# Patient Record
Sex: FEMALE | Race: WHITE | Employment: OTHER | URBAN - METROPOLITAN AREA
[De-identification: names, ages, dates, MRNs, and addresses within clinical notes are randomized per-mention and may not be internally consistent; named-entity substitution may affect disease eponyms.]

---

## 2017-08-17 ENCOUNTER — HISTORICAL (OUTPATIENT)
Dept: PREADMISSION TESTING | Facility: HOSPITAL | Age: 52
End: 2017-08-17

## 2017-08-17 LAB
ABS NEUT (OLG): 2.93 X10(3)/MCL (ref 2.1–9.2)
ALBUMIN SERPL-MCNC: 3.9 GM/DL (ref 3.4–5)
ALBUMIN/GLOB SERPL: 1.4 RATIO (ref 1.1–2)
ALP SERPL-CCNC: 50 UNIT/L (ref 38–126)
ALT SERPL-CCNC: 17 UNIT/L (ref 12–78)
AST SERPL-CCNC: 7 UNIT/L (ref 15–37)
BASOPHILS # BLD AUTO: 0 X10(3)/MCL (ref 0–0.2)
BASOPHILS NFR BLD AUTO: 1 %
BILIRUB SERPL-MCNC: 0.5 MG/DL (ref 0.2–1)
BILIRUBIN DIRECT+TOT PNL SERPL-MCNC: 0.1 MG/DL (ref 0–0.5)
BILIRUBIN DIRECT+TOT PNL SERPL-MCNC: 0.4 MG/DL (ref 0–0.8)
BUN SERPL-MCNC: 20 MG/DL (ref 7–18)
CALCIUM SERPL-MCNC: 8.6 MG/DL (ref 8.5–10.1)
CHLORIDE SERPL-SCNC: 107 MMOL/L (ref 98–107)
CO2 SERPL-SCNC: 26 MMOL/L (ref 21–32)
CREAT SERPL-MCNC: 0.72 MG/DL (ref 0.55–1.02)
EOSINOPHIL # BLD AUTO: 0.1 X10(3)/MCL (ref 0–0.9)
EOSINOPHIL NFR BLD AUTO: 2 %
ERYTHROCYTE [DISTWIDTH] IN BLOOD BY AUTOMATED COUNT: 13.3 % (ref 11.5–17)
GLOBULIN SER-MCNC: 2.7 GM/DL (ref 2.4–3.5)
GLUCOSE SERPL-MCNC: 73 MG/DL (ref 74–106)
HCT VFR BLD AUTO: 39.3 % (ref 37–47)
HGB BLD-MCNC: 13 GM/DL (ref 12–16)
LYMPHOCYTES # BLD AUTO: 1.3 X10(3)/MCL (ref 0.6–4.6)
LYMPHOCYTES NFR BLD AUTO: 27 %
MCH RBC QN AUTO: 29.3 PG (ref 27–31)
MCHC RBC AUTO-ENTMCNC: 33.1 GM/DL (ref 33–36)
MCV RBC AUTO: 88.7 FL (ref 80–94)
MONOCYTES # BLD AUTO: 0.3 X10(3)/MCL (ref 0.1–1.3)
MONOCYTES NFR BLD AUTO: 6 %
NEUTROPHILS # BLD AUTO: 2.93 X10(3)/MCL (ref 2.1–9.2)
NEUTROPHILS NFR BLD AUTO: 63 %
PLATELET # BLD AUTO: 153 X10(3)/MCL (ref 130–400)
PMV BLD AUTO: 8.9 FL (ref 9.4–12.4)
POTASSIUM SERPL-SCNC: 4.4 MMOL/L (ref 3.5–5.1)
PROT SERPL-MCNC: 6.6 GM/DL (ref 6.4–8.2)
RBC # BLD AUTO: 4.43 X10(6)/MCL (ref 4.2–5.4)
SODIUM SERPL-SCNC: 141 MMOL/L (ref 136–145)
WBC # SPEC AUTO: 4.6 X10(3)/MCL (ref 4.5–11.5)

## 2017-09-06 ENCOUNTER — HISTORICAL (OUTPATIENT)
Dept: SURGERY | Facility: HOSPITAL | Age: 52
End: 2017-09-06

## 2019-03-29 LAB
BILIRUB SERPL-MCNC: NEGATIVE MG/DL
BLOOD URINE, POC: NEGATIVE
CLARITY, POC UA: NORMAL
COLOR, POC UA: NORMAL
GLUCOSE UR QL STRIP: NEGATIVE
KETONES UR QL STRIP: NEGATIVE
LEUKOCYTE EST, POC UA: NORMAL
NITRITE, POC UA: NEGATIVE
PH, POC UA: 7
PROTEIN, POC: NEGATIVE
SPECIFIC GRAVITY, POC UA: 1.01
UROBILINOGEN, POC UA: NORMAL

## 2022-04-10 ENCOUNTER — HISTORICAL (OUTPATIENT)
Dept: ADMINISTRATIVE | Facility: HOSPITAL | Age: 57
End: 2022-04-10

## 2022-04-26 VITALS
OXYGEN SATURATION: 100 % | BODY MASS INDEX: 26.31 KG/M2 | DIASTOLIC BLOOD PRESSURE: 76 MMHG | HEIGHT: 60 IN | SYSTOLIC BLOOD PRESSURE: 119 MMHG | WEIGHT: 134 LBS

## 2022-04-30 NOTE — OP NOTE
Patient:   Ingrid Zuniga            MRN: [id number]            FIN: [id number]               Age:   52 years     Sex:  Female     :  1965   Associated Diagnoses:   Cholecystitis, chronic   Author:   Channing Luna MD      Operative Note   Operative Information   Date/ Time:  2017 12:16:00.     Procedures Performed: Procedure Code   Cholecystectomy Laparoscopic (None) on 2017 at 52 Years.  Comments:  2017 10:17 - [place] BUSH, Melida RAMIREZ  auto-populated from documented surgical case.     Indications: chronic cholecystitis.     Preoperative Diagnosis: Cholecystitis, chronic ([id number] K81.1).     Postoperative Diagnosis: Cholecystitis, chronic ([id number]-UC K81.1).     Surgeon: Channing Luna MD.     Assistant: Svetlana Koehler.     Anesthesia: Gen..     Speciman Removed: gallbladder.     Description of Procedure/Findings/    Complications:     The patient was taken to the operating room. Patient was placed under general anesthesia. Abdomen was prepped and draped in the usual sterile fashion. An appropriate timeout was performed. Optical tipped trocar was used to access the peritoneal cavity. Pneumoperitoneum was instituted. Abdominal exploration was negative. Gallbladder was noted and held in a cephalad direction. The infundibulum was noted and held in a lateral direction. The peritoneum overlying the infundibulum was dissected revealing the cystic duct gallbladder junction and the cystic artery. Critical view was obtained. The cystic duct and cystic artery were clipped and transected. The gallbladder was removed from the undersurface of the liver with sharp and electric dissection. Hemostasis was assured. The clips were in excellent position and there was no bleeding or leakage of bile. Gallbladder was completely removed from the liver hemostasis was assured. The gallbladder was removed from the abdomen. Once again hemostasis was assured the operative site was irrigated until clear.  Trochars were removed and pneumoperitoneum released the incisions were closed with Vicryl..     Esimated blood loss: loss less than  15  cc.     Complications: None.

## 2022-06-23 ENCOUNTER — CLINICAL SUPPORT (OUTPATIENT)
Dept: OTHER | Facility: CLINIC | Age: 57
End: 2022-06-23
Payer: COMMERCIAL

## 2022-06-23 DIAGNOSIS — Z00.8 ENCOUNTER FOR OTHER GENERAL EXAMINATION: ICD-10-CM

## 2022-06-23 PROCEDURE — 99211 OFF/OP EST MAY X REQ PHY/QHP: CPT | Mod: S$GLB,,, | Performed by: INTERNAL MEDICINE

## 2022-06-23 PROCEDURE — 80061 LIPID PANEL: CPT | Mod: QW,S$GLB,, | Performed by: INTERNAL MEDICINE

## 2022-06-23 PROCEDURE — 82947 PR  ASSAY QUANTITATIVE,BLOOD GLUCOSE: ICD-10-PCS | Mod: QW,S$GLB,, | Performed by: INTERNAL MEDICINE

## 2022-06-23 PROCEDURE — 99211 PR OFFICE/OUTPT VISIT, EST, LEVL I: ICD-10-PCS | Mod: S$GLB,,, | Performed by: INTERNAL MEDICINE

## 2022-06-23 PROCEDURE — 80061 PR  LIPID PANEL: ICD-10-PCS | Mod: QW,S$GLB,, | Performed by: INTERNAL MEDICINE

## 2022-06-23 PROCEDURE — 82947 ASSAY GLUCOSE BLOOD QUANT: CPT | Mod: QW,S$GLB,, | Performed by: INTERNAL MEDICINE

## 2022-06-24 VITALS
DIASTOLIC BLOOD PRESSURE: 85 MMHG | WEIGHT: 129.38 LBS | HEIGHT: 60 IN | BODY MASS INDEX: 25.4 KG/M2 | SYSTOLIC BLOOD PRESSURE: 130 MMHG

## 2022-06-24 LAB
GLUCOSE SERPL-MCNC: 83 MG/DL (ref 60–140)
HDLC SERPL-MCNC: 63 MG/DL
POC CHOLESTEROL, LDL (DOCK): 142.86 MG/DL
POC CHOLESTEROL, TOTAL: 221 MG/DL
TRIGL SERPL-MCNC: 86 MG/DL

## 2022-09-21 ENCOUNTER — HISTORICAL (OUTPATIENT)
Dept: ADMINISTRATIVE | Facility: HOSPITAL | Age: 57
End: 2022-09-21
Payer: COMMERCIAL